# Patient Record
Sex: MALE | Race: WHITE | NOT HISPANIC OR LATINO | Employment: OTHER | URBAN - METROPOLITAN AREA
[De-identification: names, ages, dates, MRNs, and addresses within clinical notes are randomized per-mention and may not be internally consistent; named-entity substitution may affect disease eponyms.]

---

## 2017-01-19 DIAGNOSIS — I25.10 CORONARY ARTERY DISEASE, ANGINA PRESENCE UNSPECIFIED, UNSPECIFIED VESSEL OR LESION TYPE, UNSPECIFIED WHETHER NATIVE OR TRANSPLANTED HEART: Primary | ICD-10-CM

## 2017-01-22 ENCOUNTER — HOSPITAL ENCOUNTER (OUTPATIENT)
Dept: SLEEP MEDICINE | Facility: HOSPITAL | Age: 57
Discharge: HOME | End: 2017-01-22
Attending: NURSE PRACTITIONER
Payer: MEDICAID

## 2017-01-22 PROCEDURE — 95810 POLYSOM 6/> YRS 4/> PARAM: CPT

## 2017-01-24 ENCOUNTER — INITIAL CONSULT (OUTPATIENT)
Dept: CARDIOLOGY | Facility: CLINIC | Age: 57
End: 2017-01-24
Payer: MEDICAID

## 2017-01-24 ENCOUNTER — INITIAL CONSULT (OUTPATIENT)
Dept: RADIATION ONCOLOGY | Facility: CLINIC | Age: 57
End: 2017-01-24
Payer: MEDICAID

## 2017-01-24 VITALS
SYSTOLIC BLOOD PRESSURE: 157 MMHG | WEIGHT: 205 LBS | HEART RATE: 68 BPM | DIASTOLIC BLOOD PRESSURE: 68 MMHG | BODY MASS INDEX: 29.35 KG/M2 | HEIGHT: 70 IN | OXYGEN SATURATION: 98 %

## 2017-01-24 VITALS
SYSTOLIC BLOOD PRESSURE: 157 MMHG | RESPIRATION RATE: 20 BRPM | HEIGHT: 70 IN | DIASTOLIC BLOOD PRESSURE: 68 MMHG | TEMPERATURE: 98 F | BODY MASS INDEX: 29.35 KG/M2 | WEIGHT: 205 LBS | HEART RATE: 68 BPM

## 2017-01-24 DIAGNOSIS — I20.89 ANGINAL EQUIVALENT: ICD-10-CM

## 2017-01-24 DIAGNOSIS — T82.855A CORONARY STENT RESTENOSIS, INITIAL ENCOUNTER: ICD-10-CM

## 2017-01-24 DIAGNOSIS — I25.10 CORONARY ARTERY DISEASE, ANGINA PRESENCE UNSPECIFIED, UNSPECIFIED VESSEL OR LESION TYPE, UNSPECIFIED WHETHER NATIVE OR TRANSPLANTED HEART: Primary | ICD-10-CM

## 2017-01-24 PROCEDURE — 99213 OFFICE O/P EST LOW 20 MIN: CPT | Mod: PBBFAC | Performed by: RADIOLOGY

## 2017-01-24 PROCEDURE — 99999 PR PBB SHADOW E&M-EST. PATIENT-LVL III: CPT | Mod: PBBFAC,,, | Performed by: RADIOLOGY

## 2017-01-24 PROCEDURE — 99202 OFFICE O/P NEW SF 15 MIN: CPT | Mod: S$PBB,,, | Performed by: RADIOLOGY

## 2017-01-24 PROCEDURE — 99205 OFFICE O/P NEW HI 60 MIN: CPT | Mod: S$PBB,,, | Performed by: INTERNAL MEDICINE

## 2017-01-24 PROCEDURE — 99999 PR PBB SHADOW E&M-EST. PATIENT-LVL IV: CPT | Mod: PBBFAC,,, | Performed by: INTERNAL MEDICINE

## 2017-01-24 RX ORDER — PREDNISONE 10 MG/1
50 TABLET ORAL SEE ADMIN INSTRUCTIONS
Status: CANCELLED | OUTPATIENT
Start: 2017-01-24

## 2017-01-24 RX ORDER — DIPHENHYDRAMINE HCL 25 MG
50 CAPSULE ORAL EVERY 6 HOURS
Status: CANCELLED | OUTPATIENT
Start: 2017-01-24 | End: 2017-01-25

## 2017-01-24 RX ORDER — FAMOTIDINE 10 MG/1
20 TABLET ORAL 2 TIMES DAILY
Status: CANCELLED | OUTPATIENT
Start: 2017-01-24 | End: 2017-01-26

## 2017-01-24 RX ORDER — SODIUM CHLORIDE 9 MG/ML
3 INJECTION, SOLUTION INTRAVENOUS CONTINUOUS
Status: CANCELLED | OUTPATIENT
Start: 2017-01-24 | End: 2017-01-24

## 2017-01-24 NOTE — LETTER
January 24, 2017      Melchor Busby MD  [address]  [place] [address]           [facility]  [address]  [facility] [address]  Phone: [phone]          Patient: Adama Alvarez   MR Number: [id number]   YOB: 1960   Date of Visit: 1/24/2017       Dear Dr. Melchor Busby:    Thank you for referring Adama Alvarez to me for evaluation. Attached you will find relevant portions of my assessment and plan of care.    If you have questions, please do not hesitate to call me. I look forward to following Adama Alvarez along with you.    Sincerely,    Yusef Arizmendi MD    Enclosure  CC:  No Recipients    If you would like to receive this communication electronically, please contact [email or web address] or [phone] to request more information on [employer] Link access.    For providers and/or their staff who would like to refer a patient to [facility], please contact us through our one-stop-shop provider referral line, [facility], at [phone].    If you feel you have received this communication in error or would no longer like to receive these types of communications, please e-mail [email or web address]

## 2017-01-24 NOTE — LETTER
January 24, 2017      Yusef Arizmendi MD  [address]  [place] [address]           [facility] Radiation On[facility]  [address]  [place] [address]  Phone: [phone]          Patient: Adama Alvarez   MR Number: [id number]   YOB: 1960   Date of Visit: 1/24/2017       Dear Dr. Yusef Arizmendi:    Thank you for referring Adama Alvarez to me for evaluation. Attached you will find relevant portions of my assessment and plan of care.    If you have questions, please do not hesitate to call me. I look forward to following Adama Alvarez along with you.    Sincerely,    Nader Holloway MD    Enclosure  CC:  No Recipients    If you would like to receive this communication electronically, please contact [email or web address] or [phone] to request more information on [employer] Link access.    For providers and/or their staff who would like to refer a patient to [facility], please contact us through our one-stop-shop provider referral line, [facility], at [phone].    If you feel you have received this communication in error or would no longer like to receive these types of communications, please e-mail [email or web address]

## 2017-01-24 NOTE — PROGRESS NOTES
Subjective:       Patient ID: Adama Alvarez is a 56 y.o. male.    Chief Complaint: cardiac stenosis (radiation consent)    HPI:  This patient is referred for consultation regarding cardiac brachytherapy for prevention of instent restenosis.      Adama Alvarez is a 56 y.o. male with history of ischemic CMP (EF 40-45%), previous LAD and LCx stents (CIS -  in 2006 followed by PTA only in June 2014 by ), PAD, HTN and dyslipidemia who was referred by Dr.Lee Busby following his last coronary angiogram on Nov 2016.  He had an abnormal stress test (moderate reversible defect in apical wall) and received coronary angiogram at [facility] showing severe ISR of previously placed LAD stent and moderate disease of prox LCx.  He has been referred for consideration of PCI with cardiac brachytherapy to reduce the risk of instent restenosis.  He does not yet have his PCI scheduled.  Today he feels relatively well while at rest.  However he becomes SOB with minimal activity such as washing dishes.  He still smokes a few cigarettes per day.        Objective:      Physical Exam:  Patient is alert and is oriented to person, place, and time. He appears well-developed and well-nourished.  HEENT: PERRL EOMI sclerae anicteric.  CV: RRR no m/r/g  Neuro: nonfocal.      Assessment/Plan:       History of CAD with instent restenosis.  Discussed the rationale for cardiac brachytherapy in this setting.  Discussed the procedures, risks and benefits of therapy.  His questions were answered and consent form was signed.  He will schedule is PCI with Dr. Arizmendi's office and we will proceed with treatment if appropriate. Twenty minutes were spent in consultation with the patient, of which more than 50% was spent in face to face counseling.

## 2017-01-24 NOTE — PROGRESS NOTES
Subjective:    Patient ID:  Adama Alvarez is a 56 y.o. male who presents for evaluation of Angina Equivalent and abnormal findings on his Coronary Angiogram.    HPI  55 y/o M with history of ischemic CMP (EF 40-45%), previous LAD and LCx stents (CIS -  in 2006 followed by PTA only in June 2014 by ), PAD, HTN and dyslipidemia who was referred by Dr.Lee Busby following his last coronary angiogram on Nov 2016.    The patient was evaluated by  for worsening DE LEON, had an abnormal stress testing (moderate reversibel defect in apical wall) and received coronary angiogram at [facility] showing severe ISR of previously placed LAD stent and moderate disease of prox LCx.     Patient also has a long standing history of claudication. He experience lower ext (calf mm and feet, L>R) pain when walking 100-150 ft. Denied any orthopnea, PND, LE swelling, palpitations, headaches or nausea. No changes in bilateral sciatica pain. Patient was taking methocarbamol for neurogenic claudication in past but stopped it because it was causing him severe itching. Patient compliant with all other medications. Former smoker 1.5 PPD x 40 yrs (Quit - 4/2016) but retstarted again about 1-2 cigarette.     Review of Systems   Constitution: Positive for weight gain (12 lb for the last 3 mo).   HENT: Negative.    Eyes: Negative.    Cardiovascular: Positive for chest pain, claudication, dyspnea on exertion and orthopnea. Negative for leg swelling, near-syncope, palpitations, paroxysmal nocturnal dyspnea and syncope.   Respiratory: Positive for cough and shortness of breath.    Hematologic/Lymphatic: Bruises/bleeds easily.   Skin: Negative.    Musculoskeletal: Positive for joint pain (bilateral hips) and muscle cramps (calf mm (R>L)).   Gastrointestinal: Negative.    Genitourinary: Negative.    Neurological: Positive for loss of balance (not using any cane ).   Psychiatric/Behavioral: Negative.         Objective:     Physical Exam   Constitutional: He is oriented to person, place, and time. He appears well-developed and well-nourished. No distress.   HENT:   Head: Normocephalic and atraumatic.   Neck: Neck supple. JVD present.   Cardiovascular: Regular rhythm.    Murmur heard.   Harsh midsystolic murmur is present with a grade of 2/6  at the upper right sternal border radiating to the neck  Pulses:       Carotid pulses are 2+ on the right side, and 2+ on the left side.       Radial pulses are 2+ on the right side, and 2+ on the left side.        Femoral pulses are 2+ on the right side, and 1+ on the left side.       Dorsalis pedis pulses are 1+ on the right side        Posterior tibial pulses are 2+ on the right side, and 1+ on the left side.   Normal Chapin's test - R-hand   Pulmonary/Chest: Effort normal and breath sounds normal. No respiratory distress. He has no wheezes.   Abdominal: Soft. He exhibits no distension. There is no tenderness.   Musculoskeletal: He exhibits no edema.   Neurological: He is alert and oriented to person, place, and time.   Skin: Skin is warm and dry.   Psychiatric: He has a normal mood and affect. His behavior is normal.         Assessment:       1. CAD   Cor Angio (Nov 2016) ISR of LAD stent and Prox LCx dis   Abnormal Stress test in apical wall   LVEF (45%) per last echo - Oct 2015    2. Claudication (sunny IIa) - L>R   Abnormal OLIVIA (left 0.65) - July 2016   CTA aort0-iliac w RO (Sept 2015): Bilateral lower extremity outflow stenosis, with multiple tandem 50% stenotic lesions in distal right SFA, long segment of complete occlusion of mid left SFA; both popliteal arteries normal in caliber with 3-vessel runoff to ankle.    3. HTN   Not controlled.   Quit Imdur because of concerns about sleep disturbances.  4. HLP   On statin    5. CKD- stage III   Cr baseline - 1.4-1.5    6.Current smoking   Counseled about smoking cessation.  7. PETRONA       Plan:           Consented for [facility] w possible  PCI  Radiation oncology for Brachytherapy treatment.    The risks (including death, heart attack, limb loss, paralysis, emergency surgery, bleeding, renal failure, and stroke), the potential benefits of the procedure, and the alternatives to the procedure, were discussed in detail and accepted by the patient. All questions were answered. Patient agrees to proceed.    Maryanne Zapien MD  Interventional Cardiology Fellow, PGY-9  1/24/2017 12:11 PM         I have personally taken the history and examined this patient. I have discussed and agree with the resident's findings and plan as documented in the resident's note.  Yusef Arizmendi

## 2017-01-26 ENCOUNTER — DOCUMENTATION ONLY (OUTPATIENT)
Dept: CARDIOLOGY | Facility: CLINIC | Age: 57
End: 2017-01-26

## 2017-01-26 NOTE — PROGRESS NOTES
OUTPATIENT CATHETERIZATION INSTRUCTIONS    You have been scheduled for a procedure in the catheterization lab on Friday, February 3,  2017.     Please report to the Cardiology Waiting Area on the Third floor of the hospital and check in at 8 AM.   You will then be taken to the SSCU (Short Stay Cardiac Unit) and prepared for your procedure. Please be aware that this is not the time of your procedure but the time you are to arrive. The procedures are scheduled on an hourly basis; however, emergency cases take precedence over all other cases.       You may not have anything to eat or drink after midnight the night before your test. You may take your regular morning medications with water. If there are any medications that you should not take you will be instructed to hold them that morning. If you are diabetic and on Metformin (Glucophage) do not take it the day before, the day of, and for 2 days after your procedure.      The procedure will take 1-2 hours to perform. After the procedure, you will return to SSCU on the third floor of the hospital. You will need to lie still (or keep your arm still) for the next 4 to 6 hours to minimize bleeding from the puncture site. Your family may remain in the room with you during this time.       You may be able to be discharged home that same afternoon if there is someone to drive you home and there were no complications. If you have one of the balloon, stent, or device procedures you may spend the night in the hospital. Your doctor will determine, based on your progress, the date and time of your discharge. The results of your procedure will be discussed with you before you are discharged. Any further testing or procedures will be scheduled for you either before you leave or you will be called with these appointments.       If you should have any questions, concerns, or need to change the date of your procedure, please call  SUSANNE Hoover @ [phone]    Special  Instructions:Because you are sensitive to Iodine Dye and dye is used to perform the test, your doctor would like you to take several medications before the procedure to help prevent a severe reaction. You will be given prescriptions for Benadryl 50mg, Prednisone 50mg, and Tagamet 300mg. You will be given 3 of each pill. Take 1 of each pill (Benadryl, Prednisone, and Tagamet) at 6pm the night before your procedure, 1 of each pill (Benadryl, Prednisone, and Tagamet) at 11pm the night before your procedure, and 1 of each pill (Benadryl, Prednisone, and Tagamet) at 6am the morning of your procedure.          THE ABOVE INSTRUCTIONS WERE GIVEN TO THE PATIENT VERBALLY AND THEY VERBALIZED UNDERSTANDING.  THEY DO NOT REQUIRE ANY SPECIAL NEEDS AND DO NOT HAVE ANY LEARNING BARRIERS.          Directions for Reporting to Cardiology Waiting Area in the Hospital  If you park in the Parking Garage:  Take elevators to the1st floor of the parking garage.  Continue past the gift shop, coffee shop, and piano.  Take a right and go to the gold elevators. (Elevator B)  Take the elevator to the 3rd floor.  Follow the arrow on the sign on the wall that says Cath Lab Registration/EP Lab Registration.  Follow the long hallway all the way around until you come to a big open area.  This is the registration area.  Check in at Reception Desk.    OR    If family is dropping you off:  Have them drop you off at the front of the Hospital under the green overhang.  Enter through the doors and take a right.  Take the E elevators to the 3rd floor Cardiology Waiting Area.  Check in at the Reception Desk in the waiting room.

## 2017-02-03 ENCOUNTER — HOSPITAL ENCOUNTER (OUTPATIENT)
Facility: HOSPITAL | Age: 57
Discharge: HOME | End: 2017-02-04
Attending: INTERNAL MEDICINE | Admitting: INTERNAL MEDICINE
Payer: MEDICAID

## 2017-02-03 DIAGNOSIS — I25.10 CORONARY ARTERY DISEASE, ANGINA PRESENCE UNSPECIFIED, UNSPECIFIED VESSEL OR LESION TYPE, UNSPECIFIED WHETHER NATIVE OR TRANSPLANTED HEART: ICD-10-CM

## 2017-02-03 DIAGNOSIS — I25.118 CORONARY ARTERY DISEASE WITH STABLE ANGINA PECTORIS, UNSPECIFIED VESSEL OR LESION TYPE, UNSPECIFIED WHETHER NATIVE OR TRANSPLANTED HEART: Primary | ICD-10-CM

## 2017-02-03 DIAGNOSIS — N39.0 URINARY TRACT INFECTION: ICD-10-CM

## 2017-02-03 LAB
ABO + RH BLD: NORMAL
ANION GAP SERPL CALC-SCNC: 8 MMOL/L
APTT BLDCRRT: 27.2 SEC
BASOPHILS # BLD AUTO: 0.02 K/UL
BASOPHILS NFR BLD: 0.1 %
BLD GP AB SCN CELLS X3 SERPL QL: NORMAL
BUN SERPL-MCNC: 30 MG/DL
CALCIUM SERPL-MCNC: 9 MG/DL
CHLORIDE SERPL-SCNC: 105 MMOL/L
CO2 SERPL-SCNC: 23 MMOL/L
CORONARY STENOSIS: ABNORMAL
CREAT SERPL-MCNC: 1.8 MG/DL
DIFFERENTIAL METHOD: ABNORMAL
EOSINOPHIL # BLD AUTO: 0 K/UL
EOSINOPHIL NFR BLD: 0 %
ERYTHROCYTE [DISTWIDTH] IN BLOOD BY AUTOMATED COUNT: 14.6 %
ERYTHROCYTE [DISTWIDTH] IN BLOOD BY AUTOMATED COUNT: 14.6 %
EST. GFR  (AFRICAN AMERICAN): 47.9 ML/MIN/1.73 M^2
EST. GFR  (NON AFRICAN AMERICAN): 41.4 ML/MIN/1.73 M^2
GLUCOSE SERPL-MCNC: 149 MG/DL
HCT VFR BLD AUTO: 42.7 %
HCT VFR BLD AUTO: 42.7 %
HGB BLD-MCNC: 14.5 G/DL
HGB BLD-MCNC: 14.5 G/DL
INR PPP: 1
LYMPHOCYTES # BLD AUTO: 2.4 K/UL
LYMPHOCYTES NFR BLD: 12.5 %
MCH RBC QN AUTO: 32 PG
MCH RBC QN AUTO: 32 PG
MCHC RBC AUTO-ENTMCNC: 34 %
MCHC RBC AUTO-ENTMCNC: 34 %
MCV RBC AUTO: 94 FL
MCV RBC AUTO: 94 FL
MONOCYTES # BLD AUTO: 0.6 K/UL
MONOCYTES NFR BLD: 3.3 %
NEUTROPHILS # BLD AUTO: 15.7 K/UL
NEUTROPHILS NFR BLD: 83.7 %
PLATELET # BLD AUTO: 232 K/UL
PLATELET # BLD AUTO: 232 K/UL
PMV BLD AUTO: 11.1 FL
PMV BLD AUTO: 11.1 FL
POTASSIUM SERPL-SCNC: 4.8 MMOL/L
PROTHROMBIN TIME: 10.4 SEC
RBC # BLD AUTO: 4.53 M/UL
RBC # BLD AUTO: 4.53 M/UL
SODIUM SERPL-SCNC: 136 MMOL/L
WBC # BLD AUTO: 18.83 K/UL
WBC # BLD AUTO: 18.83 K/UL

## 2017-02-03 PROCEDURE — 92974 CATH PLACE CARDIO BRACHYTX: CPT | Mod: LD,,, | Performed by: INTERNAL MEDICINE

## 2017-02-03 PROCEDURE — 25000003 PHARM REV CODE 250: Performed by: INTERNAL MEDICINE

## 2017-02-03 PROCEDURE — 25000003 PHARM REV CODE 250: Performed by: HOSPITALIST

## 2017-02-03 PROCEDURE — 86900 BLOOD TYPING SEROLOGIC ABO: CPT

## 2017-02-03 PROCEDURE — C1769 GUIDE WIRE: HCPCS

## 2017-02-03 PROCEDURE — 93010 ELECTROCARDIOGRAM REPORT: CPT | Mod: 76,,, | Performed by: INTERNAL MEDICINE

## 2017-02-03 PROCEDURE — 63600175 PHARM REV CODE 636 W HCPCS

## 2017-02-03 PROCEDURE — 85025 COMPLETE CBC W/AUTO DIFF WBC: CPT

## 2017-02-03 PROCEDURE — 93572 IV DOP VEL&/PRESS C FLO EA: CPT | Mod: 26,,, | Performed by: INTERNAL MEDICINE

## 2017-02-03 PROCEDURE — C1760 CLOSURE DEV, VASC: HCPCS

## 2017-02-03 PROCEDURE — 25000003 PHARM REV CODE 250

## 2017-02-03 PROCEDURE — 85610 PROTHROMBIN TIME: CPT

## 2017-02-03 PROCEDURE — 93005 ELECTROCARDIOGRAM TRACING: CPT | Mod: 59

## 2017-02-03 PROCEDURE — 80048 BASIC METABOLIC PNL TOTAL CA: CPT

## 2017-02-03 PROCEDURE — 86850 RBC ANTIBODY SCREEN: CPT

## 2017-02-03 PROCEDURE — 93571 IV DOP VEL&/PRESS C FLO 1ST: CPT | Mod: 26,,, | Performed by: INTERNAL MEDICINE

## 2017-02-03 PROCEDURE — 93010 ELECTROCARDIOGRAM REPORT: CPT | Mod: ,,, | Performed by: INTERNAL MEDICINE

## 2017-02-03 PROCEDURE — 92924 PRQ TRLUML C ATHRC 1 ART&/BR: CPT | Mod: LD,,, | Performed by: INTERNAL MEDICINE

## 2017-02-03 PROCEDURE — 85730 THROMBOPLASTIN TIME PARTIAL: CPT

## 2017-02-03 RX ORDER — ATORVASTATIN CALCIUM 20 MG/1
80 TABLET, FILM COATED ORAL NIGHTLY
Status: DISCONTINUED | OUTPATIENT
Start: 2017-02-03 | End: 2017-02-04 | Stop reason: HOSPADM

## 2017-02-03 RX ORDER — METOPROLOL TARTRATE 25 MG/1
12.5 TABLET ORAL 2 TIMES DAILY
Status: DISCONTINUED | OUTPATIENT
Start: 2017-02-03 | End: 2017-02-04 | Stop reason: HOSPADM

## 2017-02-03 RX ORDER — LISINOPRIL 20 MG/1
20 TABLET ORAL NIGHTLY
Status: DISCONTINUED | OUTPATIENT
Start: 2017-02-03 | End: 2017-02-04 | Stop reason: HOSPADM

## 2017-02-03 RX ORDER — GABAPENTIN 300 MG/1
600 CAPSULE ORAL 2 TIMES DAILY
Status: DISCONTINUED | OUTPATIENT
Start: 2017-02-03 | End: 2017-02-04 | Stop reason: HOSPADM

## 2017-02-03 RX ORDER — RANOLAZINE 500 MG/1
1000 TABLET, EXTENDED RELEASE ORAL 2 TIMES DAILY
Status: DISCONTINUED | OUTPATIENT
Start: 2017-02-03 | End: 2017-02-04 | Stop reason: HOSPADM

## 2017-02-03 RX ORDER — SODIUM CHLORIDE 9 MG/ML
3 INJECTION, SOLUTION INTRAVENOUS CONTINUOUS
Status: ACTIVE | OUTPATIENT
Start: 2017-02-03 | End: 2017-02-03

## 2017-02-03 RX ORDER — FAMOTIDINE 20 MG/1
20 TABLET, FILM COATED ORAL 2 TIMES DAILY
Status: DISCONTINUED | OUTPATIENT
Start: 2017-02-03 | End: 2017-02-03

## 2017-02-03 RX ORDER — CLOPIDOGREL BISULFATE 75 MG/1
75 TABLET ORAL NIGHTLY
Status: DISCONTINUED | OUTPATIENT
Start: 2017-02-03 | End: 2017-02-04 | Stop reason: HOSPADM

## 2017-02-03 RX ORDER — ASPIRIN 81 MG/1
81 TABLET ORAL NIGHTLY
Status: DISCONTINUED | OUTPATIENT
Start: 2017-02-03 | End: 2017-02-04 | Stop reason: HOSPADM

## 2017-02-03 RX ORDER — ISOSORBIDE MONONITRATE 30 MG/1
30 TABLET, EXTENDED RELEASE ORAL DAILY
Status: DISCONTINUED | OUTPATIENT
Start: 2017-02-04 | End: 2017-02-04 | Stop reason: HOSPADM

## 2017-02-03 RX ORDER — EZETIMIBE 10 MG/1
10 TABLET ORAL DAILY
Status: DISCONTINUED | OUTPATIENT
Start: 2017-02-04 | End: 2017-02-04 | Stop reason: HOSPADM

## 2017-02-03 RX ORDER — HYDROCHLOROTHIAZIDE 25 MG/1
25 TABLET ORAL DAILY
Status: DISCONTINUED | OUTPATIENT
Start: 2017-02-04 | End: 2017-02-04 | Stop reason: HOSPADM

## 2017-02-03 RX ORDER — DIPHENHYDRAMINE HCL 50 MG
50 CAPSULE ORAL EVERY 6 HOURS
Status: DISCONTINUED | OUTPATIENT
Start: 2017-02-03 | End: 2017-02-03

## 2017-02-03 RX ADMIN — ASPIRIN 81 MG: 81 TABLET, COATED ORAL at 09:02

## 2017-02-03 RX ADMIN — ATORVASTATIN CALCIUM 80 MG: 20 TABLET, FILM COATED ORAL at 09:02

## 2017-02-03 RX ADMIN — LISINOPRIL 20 MG: 20 TABLET ORAL at 09:02

## 2017-02-03 RX ADMIN — RANOLAZINE 1000 MG: 500 TABLET, FILM COATED, EXTENDED RELEASE ORAL at 09:02

## 2017-02-03 RX ADMIN — CLOPIDOGREL 75 MG: 75 TABLET, FILM COATED ORAL at 09:02

## 2017-02-03 RX ADMIN — GABAPENTIN 600 MG: 300 CAPSULE ORAL at 09:02

## 2017-02-03 RX ADMIN — SODIUM CHLORIDE 3 ML/KG/HR: 0.9 INJECTION, SOLUTION INTRAVENOUS at 08:02

## 2017-02-03 RX ADMIN — Medication 12.5 MG: at 09:02

## 2017-02-03 NOTE — DISCHARGE SUMMARY
[facility]  Cardiology  Discharge Summary      Patient Name: Adama Alvarez  MRN: [id number]  Admission Date: 2/3/2017  Hospital Length of Stay: 0 days  Discharge Date and Time: 2/3/2017 5:24 PM  Attending Physician: Yusef Arizmendi MD  Discharging Provider: Gennaro Draper MD  Primary Care Physician: YESI Schrader    HPI: 57 yo male with CAD with LAD ISR with CCS III angina      Procedure(s) (LRB):  [id number]-[id number]-[id number]-CORONARY (PTCA) (Left)  BRACHYTHERAPY (Left)  HEART CATH-LEFT (Left)     Indwelling Lines/Drains at time of discharge:  Lines/Drains/Airways          No matching active lines, drains, or airways          Hospital Course (synopsis of major diagnoses, care, treatment, and services provided during the course of the hospital stay): Orbital atherectomy of LAD ISR followed by POBA with a 3.0 x 20 mm balloon  Brachytherapy by Rad-onc        Final Active Diagnoses:    Diagnosis Date Noted POA    Coronary artery disease [I25.10] 02/03/2017 Yes      Problems Resolved During this Admission:    Diagnosis Date Noted Date Resolved POA       Discharged Condition: good    Disposition: Home or Self Care    Follow Up:  Follow-up Information     Follow up with Melchor Busby MD In 1 month.    Specialty:  Cardiology    Contact information:    [address]  [place] [id number] [phone]          Patient Instructions:     Diet Cardiac     Type & Screen   Standing Status: Future  Standing Exp. Date: 03/25/18       Medications:  Reconciled Home Medications:   Current Discharge Medication List      CONTINUE these medications which have NOT CHANGED    Details   ASPIR-LOW 81 mg EC tablet Take 81 mg by mouth every evening.  Refills: 11      atorvastatin (LIPITOR) 80 MG tablet Take 1 tablet (80 mg total) by mouth every evening.  Qty: 30 tablet, Refills: 11    Associated Diagnoses: Mixed hyperlipidemia      clopidogrel (PLAVIX) 75 mg tablet Take 1 tablet (75 mg total) by mouth  once daily.  Qty: 90 tablet, Refills: 11      docusate sodium (COLACE) 50 MG capsule Take 1 capsule (50 mg total) by mouth 2 (two) times daily as needed for Constipation.  Qty: 20 capsule, Refills: 0      ezetimibe (ZETIA) 10 mg tablet Take 1 tablet (10 mg total) by mouth once daily.  Qty: 30 tablet, Refills: 11    Associated Diagnoses: Dyslipidemia      gabapentin (NEURONTIN) 600 MG tablet Take 1 tablet (600 mg total) by mouth 2 (two) times daily.  Qty: 60 tablet, Refills: 11    Associated Diagnoses: Lumbar radiculopathy      hydrochlorothiazide (HYDRODIURIL) 25 MG tablet Take 1 tablet (25 mg total) by mouth once daily.  Qty: 30 tablet, Refills: 11    Associated Diagnoses: Essential hypertension      isosorbide mononitrate (IMDUR) 30 MG 24 hr tablet Take 1 tablet (30 mg total) by mouth once daily.  Qty: 30 tablet, Refills: 11      lisinopril (PRINIVIL,ZESTRIL) 20 MG tablet Take 1 tablet (20 mg total) by mouth every evening.  Qty: 30 tablet, Refills: 11    Associated Diagnoses: Coronary artery disease of native heart with stable angina pectoris, unspecified vessel or lesion type      metoprolol tartrate (LOPRESSOR) 25 MG tablet Take 0.5 tablets (12.5 mg total) by mouth 2 (two) times daily.  Qty: 30 tablet, Refills: 11    Associated Diagnoses: Coronary artery disease of native heart with stable angina pectoris, unspecified vessel or lesion type      ranolazine (RANEXA) 1,000 mg Tb12 Take 1 tablet (1,000 mg total) by mouth 2 (two) times daily. 500 mg, twice a day for two weeks, then increase to 1000mg twice a day  Qty: 180 tablet, Refills: 11      albuterol 90 mcg/actuation inhaler Inhale 2 puffs into the lungs every 6 (six) hours as needed for Wheezing.  Qty: 18 g, Refills: 0         STOP taking these medications       cimetidine (TAGAMET) 300 MG tablet Comments:   Reason for Stopping:         diphenhydrAMINE (BANOPHEN) 50 MG capsule Comments:   Reason for Stopping:         predniSONE (DELTASONE) 50 MG Tab Comments:    Reason for Stopping:               Time spent on the discharge of patient: 30 minutes    Gennaro Draper MD  Cardiology  [facility]

## 2017-02-03 NOTE — PLAN OF CARE
Problem: Patient Care Overview  Goal: Plan of Care Review  Outcome: Ongoing (interventions implemented as appropriate)  Overnight care and needs reviewed

## 2017-02-03 NOTE — IP AVS SNAPSHOT
[facility]  [address]  [facility] [address]  Phone: [phone]           Patient Discharge Instructions     Our goal is to set you up for success. This packet includes information on your condition, medications, and your home care. It will help you to care for yourself so you don't get sicker and need to go back to the hospital.     Please ask your nurse if you have any questions.        There are many details to remember when preparing to leave the hospital. Here is what you will need to do:    1. Take your medicine. If you are prescribed medications, review your Medication List in the following pages. You may have new medications to  at the pharmacy and others that you'll need to stop taking. Review the instructions for how and when to take your medications. Talk with your doctor or nurses if you are unsure of what to do.     2. Go to your follow-up appointments. Specific follow-up information is listed in the following pages. Your may be contacted by a transition nurse or clinical provider about future appointments. Be sure we have all of the phone numbers to reach you, if needed. Please contact your provider's office if you are unable to make an appointment.     3. Watch for warning signs. Your doctor or nurse will give you detailed warning signs to watch for and when to call for assistance. These instructions may also include educational information about your condition. If you experience any of warning signs to your health, call your doctor.               [facility]  Unless otherwise directed by your provider, please contact [facility], our nurse care line that is available for 24/7 assistance.     [phone] (toll-free)    Registered nurses in the [facility] provide clinical advisement, health education, appointment booking, and other advisory services.                    ** Verify the list of medication(s) below is accurate and up  to date. Carry this with you in case of emergency. If your medications have changed, please notify your healthcare provider.             Medication List      CHANGE how you take these medications        Additional Info                      clopidogrel 75 mg tablet   Commonly known as:  PLAVIX   Quantity:  90 tablet   Refills:  11   Dose:  75 mg   What changed:  when to take this    Last time this was given:  75 mg on 2/3/2017  9:02 PM   Instructions:  Take 1 tablet (75 mg total) by mouth once daily.     Begin Date    AM    Noon    PM    Bedtime         CONTINUE taking these medications        Additional Info                      albuterol 90 mcg/actuation inhaler   Quantity:  18 g   Refills:  0   Dose:  2 puff    Instructions:  Inhale 2 puffs into the lungs every 6 (six) hours as needed for Wheezing.     Begin Date    AM    Noon    PM    Bedtime       ASPIR-LOW 81 MG EC tablet   Refills:  11   Dose:  81 mg   Generic drug:  aspirin    Last time this was given:  81 mg on 2/3/2017  9:03 PM   Instructions:  Take 81 mg by mouth every evening.     Begin Date    AM    Noon    PM    Bedtime       atorvastatin 80 MG tablet   Commonly known as:  LIPITOR   Quantity:  30 tablet   Refills:  11   Dose:  80 mg    Last time this was given:  80 mg on 2/3/2017  9:03 PM   Instructions:  Take 1 tablet (80 mg total) by mouth every evening.     Begin Date    AM    Noon    PM    Bedtime       docusate sodium 50 MG capsule   Commonly known as:  COLACE   Quantity:  20 capsule   Refills:  0   Dose:  50 mg    Instructions:  Take 1 capsule (50 mg total) by mouth 2 (two) times daily as needed for Constipation.     Begin Date    AM    Noon    PM    Bedtime       ezetimibe 10 mg tablet   Commonly known as:  ZETIA   Quantity:  30 tablet   Refills:  11   Dose:  10 mg    Instructions:  Take 1 tablet (10 mg total) by mouth once daily.     Begin Date    AM    Noon    PM    Bedtime       gabapentin 600 MG tablet   Commonly known as:  NEURONTIN    Quantity:  60 tablet   Refills:  11   Dose:  600 mg    Instructions:  Take 1 tablet (600 mg total) by mouth 2 (two) times daily.     Begin Date    AM    Noon    PM    Bedtime       hydrochlorothiazide 25 MG tablet   Commonly known as:  HYDRODIURIL   Quantity:  30 tablet   Refills:  11   Dose:  25 mg    Instructions:  Take 1 tablet (25 mg total) by mouth once daily.     Begin Date    AM    Noon    PM    Bedtime       isosorbide mononitrate 30 MG 24 hr tablet   Commonly known as:  IMDUR   Quantity:  30 tablet   Refills:  11   Dose:  30 mg    Instructions:  Take 1 tablet (30 mg total) by mouth once daily.     Begin Date    AM    Noon    PM    Bedtime       lisinopril 20 MG tablet   Commonly known as:  PRINIVIL,ZESTRIL   Quantity:  30 tablet   Refills:  11   Dose:  20 mg    Last time this was given:  20 mg on 2/3/2017  9:02 PM   Instructions:  Take 1 tablet (20 mg total) by mouth every evening.     Begin Date    AM    Noon    PM    Bedtime       metoprolol tartrate 25 MG tablet   Commonly known as:  LOPRESSOR   Quantity:  30 tablet   Refills:  11   Dose:  12.5 mg    Last time this was given:  12.5 mg on 2/3/2017  9:02 PM   Instructions:  Take 0.5 tablets (12.5 mg total) by mouth 2 (two) times daily.     Begin Date    AM    Noon    PM    Bedtime       ranolazine 1,000 mg Tb12   Commonly known as:  RANEXA   Quantity:  180 tablet   Refills:  11   Dose:  1000 mg    Last time this was given:  1,000 mg on 2/3/2017  9:02 PM   Instructions:  Take 1 tablet (1,000 mg total) by mouth 2 (two) times daily. 500 mg, twice a day for two weeks, then increase to 1000mg twice a day     Begin Date    AM    Noon    PM    Bedtime         STOP taking these medications     BANOPHEN 50 MG capsule   Generic drug:  diphenhydrAMINE       cimetidine 300 MG tablet   Commonly known as:  TAGAMET       predniSONE 50 MG Tab   Commonly known as:  DELTASONE                  Please bring to all follow up appointments:    1. A copy of your discharge  instructions.  2. All medicines you are currently taking in their original bottles.  3. Identification and insurance card.    Please arrive 15 minutes ahead of scheduled appointment time.    Please call 24 hours in advance if you must reschedule your appointment and/or time.        Your Scheduled Appointments     Feb 21, 2017 12:00 PM CST   [facility] with [facility] US[facility]   [facility] ([facility])    [address] [facility] [address]   [phone]            Feb 21, 2017  1:45 PM CST   Established Patient Visit with RENNY Sanchez - Neurology ([facility])    [address] [facility] [place] [address]   [phone]            Mar 21, 2017 10:30 AM CDT   Established Patient Visit with MD WALTER Dill - Cardiology ([address] [facility])    [address] [facility] [address]   [phone]            May 04, 2017  8:00 AM CDT   Established Patient Visit with YESI Jackson - Family Medicine ([facility])    [address] [place] [address]   [phone]            Jun 05, 2017  2:00 PM CDT   Established Patient Visit with MD WALTER Spencer - Nephrology ([facility])    [address] [place] [address] [place] [address]   [phone]              Follow-up Information     Follow up with Melchor Busby MD In 1 month.    Specialty:  Cardiology    Contact information:    [address] [facility] [address]  [phone]          Discharge Instructions     Future Orders    Type & Screen     Diet Cardiac         Discharge Instructions       1. Do not strain or lift anything greater than 5 lb for 3 days.   2. Do not drive or operate any dangerous machinery for 24 hours.   3. Keep the dressing on, clean, and dry for 24 hours.   4. After 24 hours, the dressing may be removed and a shower is allowed.   5. Clean the area with mild soap and  "water.   6. Once the skin has healed (3 days), bathing in a tub or swimming is allowed.   7. Inspect the groin site daily and report to the physician any signs of infection at the site: redness, pain, fever >100.4, unusual pain at the   access site or affected extremity, unusual swelling at the access site, or any yellow, white or green drainage.  Call 911 if you have:   Bleeding from the puncture site that you cannot stop by doing the following:   Relax and lie down right away. Keep your leg flat and apply firm pressure to the site using your fingers and a gauze pad. Keep the pressure on for 10 minutes. Continue this until the bleeding stops. This may take awhile. When bleeding stops, cover the site with a sterile bandage and keep your leg still as much as possible.    Discharge References/Attachments     CARDIAC REHABILITATION, FOLLOWING AN EXERCISE PROGRAM (ENGLISH)    REHABILITATION, CARDIAC (ENGLISH)    SMOKING, TIPS FOR QUITTING (CARDIOVASCULAR) (ENGLISH)    SMOKING CESSATION (ENGLISH)    SMOKE-FREE, STAYING (ENGLISH)        Admission Information     Date & Time Provider Department CSN    2/3/2017  8:16 AM Yusef Arizmendi MD [facility] [id number]      Care Providers     Provider Role Specialty Primary office phone    Yusef Arizmendi MD Attending Provider Cardiology [phone]      Your Vitals Were     BP Pulse Temp Resp Height Weight    100/55 (BP Location: Left arm, Patient Position: Sitting, BP Method: Automatic) 62 97.7 °F (36.5 °C) (Oral) 18 5' 6" (1.676 m) 92.5 kg (204 lb)    SpO2 BMI             99% 32.93 kg/m2         Recent Lab Values        6/16/2015 6/27/2016                        8:34 AM  8:04 AM          A1C 5.6 5.3                     Allergies as of 2/4/2017        Reactions    Contrast Media Rash    Methocarbamol Itching    Tramadol Blisters      Advance Directives     An advance directive is a document which, in the event you are no longer able to make decisions for " yourself, tells your healthcare team what kind of treatment you do or do not want to receive, or who you would like to make those decisions for you.  If you do not currently have an advance directive, [facility] encourages you to create one.  For more information call:  [phone]), [phone] ([phone]),  or log on to [email or web address].        Smoking Cessation     If you would like to quit smoking:   You may be eligible for free services if you are a [place] resident and started smoking cigarettes before September 1, 1988.  Call the Smoking Cessation Trust ([facility]) toll free at (702) 611-3758 or (400) 557-6356.   Call 7-055-QUIT-NOW if you do not meet the above criteria.            Language Assistance Services     ATTENTION: Language assistance services are available, free of charge. Please call [phone].      ATENCIÓN: Si habla español, tiene a smalls disposición servicios gratuitos de asistencia lingüística. Llame al [phone].     CHÚ Ý: N?u b?n nói Ti?ng Vi?t, có các d?ch v? h? tr? ngôn ng? mi?n phí dành cho b?n. G?i s? [phone].        Heart Failure Education       Heart Failure: Being Active  You have a condition called heart failure. Being active doesnt mean that you have to wear yourself out. Even a little movement each day helps to strengthen your heart. If you cant get out to exercise, you can do simple stretching and strengthening exercises at home. These are good ways to keep you well-conditioned and prevent you and your heart from becoming excessively weak.    Ideas to get you started  · Add a little movement to things you do now. Walk to mail letters. Park your car at the far end of the parking lot and walk to the store. Walk up a flight of stairs instead of taking the elevator.  · Choose activities you enjoy. You might walk, swim, or ride an exercise bike. Things like gardening and washing the car count, too. Other possibilities include: washing dishes,  walking the dog, walking around the mall, and doing aerobic activities with friends.  · Join a group exercise program at a [facility], a senior center, or a community center. Or look into a hospital cardiac rehabilitation program. Ask your doctor if you qualify.  Tips to keep you going  · Get up and get dressed each day. Go to a coffee shop and read a newspaper or go somewhere that you'll be in the presence of other active people. Youll feel more like being active.  · Make a plan. Choose one or more activities that you enjoy and that you can easily do. Then plan to do at least one each day. You might write your plan on a calendar.  · Go with a friend or a group if you like company. This can help you feel supported and stay motivated, too.  · Plan social events that you enjoy. This will keep you mentally engaged as well as physically motivated to do things you find pleasure in.  For your safety  · Talk with your healthcare provider before starting an exercise program.  · Exercise indoors when its too hot or too cold outside, or when the air quality is poor. Try walking at a shopping mall.  · Wear socks and sturdy shoes to maintain your balance and prevent falls.  · Start slowly. Do a few minutes several times a day at first. Increase your time and speed little by little.  · Stop and rest whenever you feel tired or get short of breath.  · Dont push yourself on days when you dont feel well.  Date Last Reviewed: 3/20/2016  © 2057-0686 [employer]. [address] [place] [address], [place] [address]. All rights reserved. This information is not intended as a substitute for professional medical care. Always follow your healthcare professional's instructions.              Heart Failure: Evaluating Your Heart  You have a condition called heart failure. To evaluate your condition, your doctor will examine you, ask questions, and do some tests. Along with looking for signs of heart failure, the doctor looks for any  other health problems that may have led to heart failure. The results of your evaluation will help your doctor form a treatment plan.  Health history and physical exam  Your visit will start with a health history. Tell the doctor about any symptoms youve noticed and about all medicines you take. Then youll have a physical exam. This includes listening to your heartbeat and breathing. Youll also be checked for swelling (edema) in your legs and neck. When you have fluid buildup or fluid in the lungs, it may be called congestive heart failure.  Diagnosing heart failure     During an echocardiogram, sound waves bounce off the heart. These are converted into a picture on the screen.   The following may be done to help your doctor form a diagnosis:  · X-rays show the size and shape of your heart. These pictures can also show fluid in your lungs.  · An electrocardiogram (ECG or EKG) shows the pattern of your heartbeat. Small pads (electrodes) are placed on your chest, arms, and legs. Wires connect the pads to the ECG machine, which records your hearts electrical signals. This can give the doctor information about heart function.  · An echocardiogram uses ultrasound waves to show the structure and movement of your heart muscle. This shows how well the heart pumps. It also shows the thickness of the heart walls, and if the heart is enlarged. It is one of the most useful, non-invasive tests as it provides information about the heart's general function. This helps your doctor make treatment decisions.  · Lab tests evaluate small amounts of blood or urine for signs of problems. A BNP lab test can help diagnose and evaluate heart failure. BNP stands for B-type natriuretic peptide. The ventricles secrete more BNP when heart failure worsens. Lab tests can also provide information about metabolic dysfunction or heart dysfunction.  Your treatment plan  Based on the results of your evaluation and tests, your doctor will develop a  treatment plan. This plan is designed to relieve some of your heart failure symptoms and help make you more comfortable. Your treatment plan may include:  · Medicine to help your heart work better and improve your quality of life  · Changes in what you eat and drink to help prevent fluid from backing up in your body  · Daily monitoring of your weight and heart failure symptoms to see how well your treatment plan is working  · Exercise to help you stay healthy  · Help with quitting smoking  · Emotional and psychological support to help adjust to the changes  · Referrals to other specialists to make sure you are being treated comprehensively  Date Last Reviewed: 3/21/2016  © 7496-8113 [employer]. [address] [place] [address], [place] [address]. All rights reserved. This information is not intended as a substitute for professional medical care. Always follow your healthcare professional's instructions.              Heart Failure: Making Changes to Your Diet  You have a condition called heart failure. When you have heart failure, excess fluid is more likely to build up in your body because your heart isn't working well. This makes the heart work harder to pump blood. Fluid buildup causes symptoms such as shortness of breath and swelling (edema). This is often referred to as congestive heart failure or CHF. Controlling the amount of salt (sodium) you eat may help stop fluid from building up. Your doctor may also tell you to reduce the amount of fluid you drink.  Reading food labels    Your healthcare provider will tell you how much sodium you can eat each day. Read food labels to keep track. Keep in mind that certain foods are high in salt. These include canned, frozen, and processed foods. Check the amount of sodium in each serving. Watch out for high-sodium ingredients. These include MSG (monosodium glutamate), baking soda, and sodium phosphate.   Eating less salt  Give yourself time to get used to eating less  salt. It may take a little while. Here are some tips to help:  · Take the saltshaker off the table. Replace it with salt-free herb mixes and spices.  · Eat fresh or plain frozen vegetables. These have much less salt than canned vegetables.  · Choose low-sodium snacks like sodium-free pretzels, crackers, or air-popped popcorn.  · Dont add salt to your food when youre cooking. Instead, season your foods with pepper, lemon, garlic, or onion.  · When you eat out, ask that your food be cooked without added salt.  · Avoid eating fried foods as these often have a great deal of salt.  If youre told to limit fluids  You may need to limit how much fluid you have to help prevent swelling. This includes anything that is liquid at room temperature, such as ice cream and soup. If your doctor tells you to limit fluid, try these tips:  · Measure drinks in a measuring cup before you drink them. This will help you meet daily goals.  · Chill drinks to make them more refreshing.  · Suck on frozen lemon wedges to quench thirst.  · Only drink when youre thirsty.  · Chew sugarless gum or suck on hard candy to keep your mouth moist.  · Weigh yourself daily to know if your body's fluid content is rising.  My sodium goal  Your healthcare provider may give you a sodium goal to meet each day. This includes sodium found in food as well as salt that you add. My goal is to eat no more than ___________ mg of sodium per day.     When to call your doctor  Call your doctor right away if you have any symptoms of worsening heart failure. These can include:  · Sudden weight gain  · Increased swelling of your legs or ankles  · Trouble breathing when youre resting or at night  · Increase in the number of pillows you have to sleep on  · Chest pain, pressure, discomfort, or pain in the jaw, neck, or back   Date Last Reviewed: 3/21/2016  © 5776-8524 [employer]. [address] [place] [address], [place] [address]. All rights reserved. This  information is not intended as a substitute for professional medical care. Always follow your healthcare professional's instructions.              Heart Failure: Medicines to Help Your Heart    You have a condition called heart failure (also known as congestive heart failure, or CHF). Your doctor will likely prescribe medicines for heart failure and any underlying health problems you have. Most heart failure patients take one or more types of medicinen. Your healthcare provider will work to find the combination of medicines that works best for you.  Heart failure medicines  Here are the most common heart failure medicines:  · ACE inhibitors lower blood pressure and decrease strain on the heart. This makes it easier for the heart to pump. Angiotensin receptor blockers have similar effects. These are prescribed for some patients instead of ACE inhibitors.  · Beta-blockers relieve stress on the heart. They also improve symptoms. They may also improve the heart's pumping action over time.  · Diuretics (also called water pills) help rid your body of excess water. This can help rid your body of swelling (edema). Having less fluid to pump means your heart doesnt have to work as hard. Some diuretics make your body lose a mineral called potassium. Your doctor will tell you if you need to take supplements or eat more foods high in potassium.  · Digoxin helps your heart pump with more strength. This helps your heart pump more blood with each beat. So, more oxygen-rich blood travels to the rest of the body.  · Aldosterone antagonists help alter hormones and decrease strain on the heart.  · Hydralazine and nitrates are two separate medicines used together to treat heart failure. They may come in one combination pill. They lower blood pressure and decrease how hard the heart has to pump.  Medicines for related conditions  Controlling other heart problems helps keep heart failure under control, too. Depending on other heart  problems you have, medicines may be prescribed to:  · Lower blood pressure (antihypertensives).  · Lower cholesterol levels (statins).  · Prevent blood clots (anticoagulants or aspirin).  · Keep the heartbeat steady (antiarrhythmics).  Date Last Reviewed: 3/5/2016  © 1629-0494 [employer]. [address] [place] [address], [place] [address]. All rights reserved. This information is not intended as a substitute for professional medical care. Always follow your healthcare professional's instructions.              Heart Failure: Procedures That May Help    The heart is a muscle that pumps oxygen-rich blood to all parts of the body. When you have heart failure, the heart is not able to pump as well as it should. Blood and fluid may back up into the lungs (congestive heart failure), and some parts of the body dont get enough oxygen-rich blood to work normally. These problems lead to the symptoms of heart failure.     Certain procedures may help the heart pump better in some cases of heart failure. Some procedures are done to treat health problems that may have caused the heart failure such as coronary artery disease or heart rhythm problems. For more serious heart failure, other options are available.  Treating artery and valve problems  If you have coronary artery disease or valve disease, procedures may be done to improve blood flow. This helps the heart pump better, which can improve heart failure symptoms. First, your doctor may do a cardiac catheterization to help detect clogged blood vessels or valve damage. During this procedure, a  thin tube (catheter) in inserted into a blood vessel and guided to the heart. There a dye is injected and a special type of X-ray (angiogram) is taken of the blood vessels. Procedures to open a blocked artery or fix damaged valves can also be done using catheterization.  · Angioplasty uses a balloon-tipped instrument at the end of the catheter. The balloon is inflated to widen the  narrowed artery. In many cases, a stent is expanded to further support the narrowed artery. A stent is a metal mesh tube.  · Valve surgery repairs or replacement of faulty valves can also be done during catheterization so blood can flow properly through the chambers of the heart.  Bypass surgery is another option to help treat blocked arteries. It uses a healthy blood vessel from elsewhere in the body. The healthy blood vessel is attached above and below the blocked area so that blood can flow around the blocked artery.  Treating heart rhythm problems  A device may be placed in the chest to help a weak heart maintain a healthy, heartbeat so the heart can pump more effectively:  · Pacemaker. A pacemaker is an implanted device that regulates your heartbeat electronically. It monitors your heart's rhythm and generates a painless electric impulse that helps the heart beat in a regular rhythm. A pacemaker is programmed to meet your specific heart rhythm needs.  · Biventricular pacing/cardiac resynchronization therapy. A type of pacemaker that paces both pumping chambers of the heart at the same time to coordinate contractions and to improve the heart's function. Some people with heart failure are candidates for this therapy.  · Implantable cardioverter defibrillator. A device similar to a pacemaker that senses when the heart is beating too fast and delivers an electrical shock to convert the fast rhythm to a normal rhythm. This can be a life saving device.  In severe cases  In more serious cases of heart failure when other treatments no longer work, other options may include:  · Ventricular assist devices (VADs). These are mechanical devices used to take over the pumping function for one or both of the heart's ventricles, or pumping chambers. A VAD may be necessary when heart failure progresses to the point that medicines and other treatments no longer help. In some cases, a VAD may be used as a bridge to  transplant.  · Heart transplant. This is replacing the diseased heart with a healthy one from a donor. This is an option for a few people who are very sick. A heart transplant is very serious and not an option for all patients. Your doctor can tell you more.  Date Last Reviewed: 3/20/2016  © 5139-4139 [employer]. [address] [place] [address], [place] [address]. All rights reserved. This information is not intended as a substitute for professional medical care. Always follow your healthcare professional's instructions.              Heart Failure: Tracking Your Weight  You have a condition called heart failure. When you have heart failure, a sudden weight gain or a steady rise in weight is a warning sign that your body is retaining too much water and salt. This could mean your heart failure is getting worse. If left untreated, it can cause problems for your lungs and result in shortness of breath. Weighing yourself each day is the best way to know if youre retaining water. If your weight goes up quickly, call your doctor. You will be given instructions on how to get rid of the excess water. You will likely need medicines and to avoid salt. This will help your heart work better.  Call your doctor if you gain more than 2 pounds in 1 day, more than 5 pounds in 1 week, or whatever weight gain you were told to report by your doctor. This is often a sign of worsening heart failure and needs to be evaluated and treated. Your doctor will tell you what to do next.   Tips for weighing yourself    · Weigh yourself at the same time each morning, wearing the same clothes. Weigh yourself after urinating and before eating.  · Use the same scale each day. Make sure the numbers are easy to read. Put the scale on a flat, hard surface -- not on a rug or carpet.  · Do not stop weighing yourself. If you forget one day, weigh again the next morning.  How to use your weight chart  · Keep your weight chart near the scale. Write  your weight on the chart as soon as you get off the scale.  · Fill in the month and the start date on the chart. Then write down your weight each day. Your chart will look like this:    · If you miss a day, leave the space blank. Weigh yourself the next day and write your weight in the next space.  · Take your weight chart with you when you go to see your doctor.  Date Last Reviewed: 3/20/2016  © 8368-5763 [employer]. [address] [place] [address]. All rights reserved. This information is not intended as a substitute for professional medical care. Always follow your healthcare professional's instructions.              Heart Failure: Warning Signs of a Flare-Up  You have a condition called heart failure. Once you have heart failure, flare-ups can happen. Below are signs that can mean your heart failure is getting worse. If you notice any of these warning signs, call your healthcare provider.  Swelling    · Your feet, ankles, or lower legs get puffier.  · You notice skin changes on your lower legs.  · Your shoes feel too tight.  · Your clothes are tighter in the waist.  · You have trouble getting rings on or off your fingers.  Shortness of breath  · You have to breathe harder even when youre doing your normal activities or when youre resting.  · You are short of breath walking up stairs or even short distances.  · You wake up at night short of breath or coughing.  · You need to use more pillows or sit up to sleep.  · You wake up tired or restless.  Other warning signs  · You feel weaker, dizzy, or more tired.  · You have chest pain or changes in your heartbeat.  · You have a cough that wont go away.  · You cant remember things or dont feel like eating.  Tracking your weight  Gaining weight is often the first warning sign that heart failure is getting worse. Gaining even a few pounds can be a sign that your body is retaining excess water and salt. Weighing yourself each day in the  morning after you urinate and before you eat, is the best way to know if you're retaining water. Get a scale that is easy to read and make sure you wear the same clothes and use the same scale every time you weigh. Your healthcare provider will show you how to track your weight. Call your doctor if you gain more than 2 pounds in 1 day, 5 pounds in 1 week, or whatever weight gain you were told to report by your doctor. This is often a sign of worsening heart failure and needs to be evaluated and treated before it compromises your breathing. Your doctor will tell you what to do next.    Date Last Reviewed: 3/15/2016  © 0899-4077 [employer]. [address] [place] [address], [place] [address]. All rights reserved. This information is not intended as a substitute for professional medical care. Always follow your healthcare professional's instructions.              Chronic Kindey Disease Education              [facility] complies with applicable Federal civil rights laws and does not discriminate on the basis of race, color, national origin, age, disability, or sex.

## 2017-02-03 NOTE — H&P (VIEW-ONLY)
Subjective:    Patient ID:  Adama Alvarez is a 56 y.o. male who presents for evaluation of Angina Equivalent and abnormal findings on his Coronary Angiogram.    HPI  55 y/o M with history of ischemic CMP (EF 40-45%), previous LAD and LCx stents (CIS -  in 2006 followed by PTA only in June 2014 by ), PAD, HTN and dyslipidemia who was referred by Dr.Lee Busby following his last coronary angiogram on Nov 2016.    The patient was evaluated by  for worsening DE LEON, had an abnormal stress testing (moderate reversibel defect in apical wall) and received coronary angiogram at [facility] showing severe ISR of previously placed LAD stent and moderate disease of prox LCx.     Patient also has a long standing history of claudication. He experience lower ext (calf mm and feet, L>R) pain when walking 100-150 ft. Denied any orthopnea, PND, LE swelling, palpitations, headaches or nausea. No changes in bilateral sciatica pain. Patient was taking methocarbamol for neurogenic claudication in past but stopped it because it was causing him severe itching. Patient compliant with all other medications. Former smoker 1.5 PPD x 40 yrs (Quit - 4/2016) but retstarted again about 1-2 cigarette.     Review of Systems   Constitution: Positive for weight gain (12 lb for the last 3 mo).   HENT: Negative.    Eyes: Negative.    Cardiovascular: Positive for chest pain, claudication, dyspnea on exertion and orthopnea. Negative for leg swelling, near-syncope, palpitations, paroxysmal nocturnal dyspnea and syncope.   Respiratory: Positive for cough and shortness of breath.    Hematologic/Lymphatic: Bruises/bleeds easily.   Skin: Negative.    Musculoskeletal: Positive for joint pain (bilateral hips) and muscle cramps (calf mm (R>L)).   Gastrointestinal: Negative.    Genitourinary: Negative.    Neurological: Positive for loss of balance (not using any cane ).   Psychiatric/Behavioral: Negative.         Objective:     Physical Exam   Constitutional: He is oriented to person, place, and time. He appears well-developed and well-nourished. No distress.   HENT:   Head: Normocephalic and atraumatic.   Neck: Neck supple. JVD present.   Cardiovascular: Regular rhythm.    Murmur heard.   Harsh midsystolic murmur is present with a grade of 2/6  at the upper right sternal border radiating to the neck  Pulses:       Carotid pulses are 2+ on the right side, and 2+ on the left side.       Radial pulses are 2+ on the right side, and 2+ on the left side.        Femoral pulses are 2+ on the right side, and 1+ on the left side.       Dorsalis pedis pulses are 1+ on the right side        Posterior tibial pulses are 2+ on the right side, and 1+ on the left side.   Normal Chapin's test - R-hand   Pulmonary/Chest: Effort normal and breath sounds normal. No respiratory distress. He has no wheezes.   Abdominal: Soft. He exhibits no distension. There is no tenderness.   Musculoskeletal: He exhibits no edema.   Neurological: He is alert and oriented to person, place, and time.   Skin: Skin is warm and dry.   Psychiatric: He has a normal mood and affect. His behavior is normal.         Assessment:       1. CAD   Cor Angio (Nov 2016) ISR of LAD stent and Prox LCx dis   Abnormal Stress test in apical wall   LVEF (45%) per last echo - Oct 2015    2. Claudication (sunny IIa) - L>R   Abnormal OLIVIA (left 0.65) - July 2016   CTA aort0-iliac w RO (Sept 2015): Bilateral lower extremity outflow stenosis, with multiple tandem 50% stenotic lesions in distal right SFA, long segment of complete occlusion of mid left SFA; both popliteal arteries normal in caliber with 3-vessel runoff to ankle.    3. HTN   Not controlled.   Quit Imdur because of concerns about sleep disturbances.  4. HLP   On statin    5. CKD- stage III   Cr baseline - 1.4-1.5    6.Current smoking   Counseled about smoking cessation.  7. PETRONA       Plan:           Consented for [facility] w possible  PCI  Radiation oncology for Brachytherapy treatment.    The risks (including death, heart attack, limb loss, paralysis, emergency surgery, bleeding, renal failure, and stroke), the potential benefits of the procedure, and the alternatives to the procedure, were discussed in detail and accepted by the patient. All questions were answered. Patient agrees to proceed.    Maryanne Zapien MD  Interventional Cardiology Fellow, PGY-9  1/24/2017 12:11 PM         I have personally taken the history and examined this patient. I have discussed and agree with the resident's findings and plan as documented in the resident's note.  Yusef Arizmendi

## 2017-02-03 NOTE — CONSULTS
"Cardiac Rehab     Adama Alvarez   [id number]   2/3/2017       Activity taught: Yes    Cardiac Rehab Phase Taught: Phase 1    Risk Factors-Modifiable: nutrition, hyperlipidemia, hypertension, sedentary lifestyle, stress, exercise    Risk Factors-Non modifiable: age, gender    Teaching Method: Verbal, Written, Living with Heart Disease    Understanding: Yes    Response: Verbalized understanding and questions answered. Due to his insurance coverage location, he will not go to cardiac rehab. Provided him with strength training exercises and a walking program he can do at home. Smoking cessation counseling <10 minutes. He is down from 3 packs a day to 2-3 cigarettes per day. He wants to quit smoking completely, but feels it is extremely difficult. Provided him with 1-800-Quit-Now resources. States there are local smoking cessation resources for him to participate.     Comments: S/P PTCA. Discussed cardiac rehab and risk factor modification. Patient declined Cardiac Rehab Phase II due insurance and location. Educational materials were used in the process and given to the patient. They included "Your Guide to Living with Heart Disease", Phase Two Cardiac Rehabilitation information along with a sample Mediterranean diet.The patient expressed understanding of the teaching and expressed desire to take a role in modifying the risk factors when they return home.        "

## 2017-02-03 NOTE — PROGRESS NOTES
Pt returned to room AAOx4 and denies pain.  Groin site c/d/i.  VSS.  Family called to bedside.  Post procedure protocol reviewed.  Will continue to monitor.

## 2017-02-03 NOTE — PROGRESS NOTES
"Post Cath Note  Referring Physician: Dr Busby  Procedure: POBA, brachytherapy  Indication: CCS III angina    HPI:   55yo with CAD with ISR of LAD stent with CCS III angina    Cath Results:   Access:  R CFA    LAD with 70% ISR with FFR of 0.78 with iv adenosine    See full cath report for further details    Intervention:   Orbital atherectomy of LAD ISR followed by POBA with a 3.0 x 20 mm balloon  Brachytherapy by Rad-onc    Closure device: Mynx  Patient tolerated the procedure well, no complications    Post Cath Exam:     Visit Vitals    /62 (BP Location: Right arm, Patient Position: Lying, BP Method: Automatic)    Pulse 78    Temp 98.2 °F (36.8 °C) (Oral)    Resp 16    Ht 5' 6" (1.676 m)    Wt 92.5 kg (204 lb)    SpO2 96%    BMI 32.93 kg/m2     No unusual pain, hematoma, thrill or bruit at vascular access site.  Distal pulse present without signs of ischemia.    Recommendations:   DAPT for atleast 1 year  High intensity statin    Signed:  Gennaro ma MD  Interventional Cardiology Fellow  Pager: [phone]  2/3/2017 12:21 PM  "

## 2017-02-03 NOTE — PLAN OF CARE
Problem: Fall Risk (Adult)  Goal: Absence of Falls  Patient will demonstrate the desired outcomes by discharge/transition of care.   Outcome: Ongoing (interventions implemented as appropriate)  Post procedure protocol and safety issues reviewed    Problem: Cardiac Catheterization (Diagnostic/Interventional) (Adult)  Goal: Signs and Symptoms of Listed Potential Problems Will be Absent, Minimized or Managed (Cardiac Catheterization)  Signs and symptoms of listed potential problems will be absent, minimized or managed by discharge/transition of care (reference Cardiac Catheterization (Diagnostic/Interventional) (Adult) CPG).   Outcome: Ongoing (interventions implemented as appropriate)  R groin site with Mynx CDI. Palpable pulses to BLE.

## 2017-02-04 VITALS
OXYGEN SATURATION: 99 % | HEART RATE: 62 BPM | BODY MASS INDEX: 32.78 KG/M2 | WEIGHT: 204 LBS | HEIGHT: 66 IN | DIASTOLIC BLOOD PRESSURE: 55 MMHG | TEMPERATURE: 98 F | SYSTOLIC BLOOD PRESSURE: 100 MMHG | RESPIRATION RATE: 18 BRPM

## 2017-02-04 NOTE — DISCHARGE INSTRUCTIONS
1. Do not strain or lift anything greater than 5 lb for 3 days.   2. Do not drive or operate any dangerous machinery for 24 hours.   3. Keep the dressing on, clean, and dry for 24 hours.   4. After 24 hours, the dressing may be removed and a shower is allowed.   5. Clean the area with mild soap and water.   6. Once the skin has healed (3 days), bathing in a tub or swimming is allowed.   7. Inspect the groin site daily and report to the physician any signs of infection at the site: redness, pain, fever >100.4, unusual pain at the   access site or affected extremity, unusual swelling at the access site, or any yellow, white or green drainage.  Call 911 if you have:   Bleeding from the puncture site that you cannot stop by doing the following:   Relax and lie down right away. Keep your leg flat and apply firm pressure to the site using your fingers and a gauze pad. Keep the pressure on for 10 minutes. Continue this until the bleeding stops. This may take awhile. When bleeding stops, cover the site with a sterile bandage and keep your leg still as much as possible.

## 2017-02-04 NOTE — PLAN OF CARE
Problem: Patient Care Overview  Goal: Plan of Care Review  Outcome: Ongoing (interventions implemented as appropriate)  Patient progressing toward all goals. Plan of care discussed with patient, no questions at this time. Patient ambulating independently, fall precautions in place. No c/o of pain or discomfort. Pulses 2+, dressing CDI.  Patient to be discharged home today.Will monitor.

## 2017-02-06 LAB
POC ACTIVATED CLOTTING TIME K: 276 SEC (ref 74–137)
SAMPLE: ABNORMAL

## 2017-02-11 ENCOUNTER — HOSPITAL ENCOUNTER (OUTPATIENT)
Dept: SLEEP MEDICINE | Facility: HOSPITAL | Age: 57
Discharge: HOME | End: 2017-02-11
Attending: NURSE PRACTITIONER
Payer: MEDICAID

## 2017-02-11 PROCEDURE — 95811 POLYSOM 6/>YRS CPAP 4/> PARM: CPT

## 2017-03-29 PROBLEM — R06.09 DYSPNEA ON EXERTION: Status: ACTIVE | Noted: 2017-03-29

## 2017-03-29 PROBLEM — I20.89 ANGINAL EQUIVALENT: Status: RESOLVED | Noted: 2017-01-24 | Resolved: 2017-03-29

## 2017-06-05 PROBLEM — G47.33 OSA ON CPAP: Status: ACTIVE | Noted: 2017-06-05

## 2017-08-01 ENCOUNTER — PATIENT MESSAGE (OUTPATIENT)
Dept: RESEARCH | Facility: HOSPITAL | Age: 57
End: 2017-08-01

## 2018-03-29 PROBLEM — R06.09 DYSPNEA ON EXERTION: Status: RESOLVED | Noted: 2017-03-29 | Resolved: 2018-03-29

## 2018-03-29 PROBLEM — I25.2 MYOCARDIAL INFARCT, OLD: Status: ACTIVE | Noted: 2018-03-29

## 2018-09-27 PROBLEM — R06.2 WHEEZING: Status: ACTIVE | Noted: 2018-09-27

## 2018-09-27 PROBLEM — M54.16 LEFT LUMBAR RADICULOPATHY: Status: ACTIVE | Noted: 2018-09-27

## 2019-01-21 DIAGNOSIS — I73.9 PAD (PERIPHERAL ARTERY DISEASE): Primary | ICD-10-CM

## 2019-01-21 RX ORDER — SODIUM CHLORIDE 0.9 % (FLUSH) 0.9 %
5 SYRINGE (ML) INJECTION
Status: CANCELLED | OUTPATIENT
Start: 2019-01-21

## 2019-01-21 RX ORDER — LIDOCAINE HYDROCHLORIDE 10 MG/ML
1 INJECTION, SOLUTION EPIDURAL; INFILTRATION; INTRACAUDAL; PERINEURAL ONCE
Status: CANCELLED | OUTPATIENT
Start: 2019-01-21 | End: 2019-01-21

## 2019-02-21 DIAGNOSIS — I73.9 PAD (PERIPHERAL ARTERY DISEASE): Primary | ICD-10-CM

## 2019-02-21 RX ORDER — LIDOCAINE HYDROCHLORIDE 10 MG/ML
1 INJECTION, SOLUTION EPIDURAL; INFILTRATION; INTRACAUDAL; PERINEURAL ONCE
Status: CANCELLED | OUTPATIENT
Start: 2019-02-21 | End: 2019-02-21

## 2019-02-21 RX ORDER — SODIUM CHLORIDE 0.9 % (FLUSH) 0.9 %
5 SYRINGE (ML) INJECTION
Status: CANCELLED | OUTPATIENT
Start: 2019-02-21

## 2019-09-04 PROBLEM — I65.21 STENOSIS OF RIGHT CAROTID ARTERY: Status: ACTIVE | Noted: 2019-09-04

## 2019-09-04 PROBLEM — E78.2 MIXED HYPERLIPIDEMIA: Status: ACTIVE | Noted: 2019-09-04

## 2020-03-04 PROBLEM — I36.1 NONRHEUMATIC TRICUSPID VALVE REGURGITATION: Status: ACTIVE | Noted: 2020-03-04

## 2021-05-06 ENCOUNTER — PATIENT MESSAGE (OUTPATIENT)
Dept: RESEARCH | Facility: HOSPITAL | Age: 61
End: 2021-05-06

## 2021-07-01 ENCOUNTER — PATIENT MESSAGE (OUTPATIENT)
Dept: ADMINISTRATIVE | Facility: OTHER | Age: 61
End: 2021-07-01

## 2022-05-02 ENCOUNTER — PATIENT MESSAGE (OUTPATIENT)
Dept: SMOKING CESSATION | Facility: CLINIC | Age: 62
End: 2022-05-02
Payer: MEDICARE

## 2022-07-25 PROBLEM — I20.9 ANGINA, CLASS II: Status: ACTIVE | Noted: 2022-07-25

## 2022-07-25 PROBLEM — R94.39 ABNORMAL MYOCARDIAL PERFUSION STUDY: Status: ACTIVE | Noted: 2022-07-25

## 2022-07-25 PROBLEM — I25.5 ISCHEMIC CARDIOMYOPATHY: Status: ACTIVE | Noted: 2022-07-25

## 2022-07-26 ENCOUNTER — HOSPITAL ENCOUNTER (OUTPATIENT)
Dept: SLEEP MEDICINE | Facility: HOSPITAL | Age: 62
Discharge: HOME | End: 2022-07-26
Attending: NURSE PRACTITIONER
Payer: MEDICARE

## 2022-07-26 DIAGNOSIS — G47.33 OBSTRUCTIVE SLEEP APNEA (ADULT) (PEDIATRIC): ICD-10-CM

## 2022-07-26 PROCEDURE — 95810 POLYSOM 6/> YRS 4/> PARAM: CPT

## 2023-09-29 NOTE — PROGRESS NOTES
Patient discharged per MD orders. Instructions given on medications, wound care, activity, signs of infection, when to call MD, and follow up appointments. Pt verbalized understanding.  Patient AAOx3, VSS, no c/o pain or discomfort at this time. Telemetry and PIV removed. Patient and family refused transport, ambulated off unit.    Propranolol Pregnancy And Lactation Text: This medication is Pregnancy Category C and it isn't known if it is safe during pregnancy. It is excreted in breast milk.

## 2024-10-05 PROBLEM — A41.9 SEVERE SEPSIS: Status: ACTIVE | Noted: 2024-10-05

## 2024-10-05 PROBLEM — R06.02 SOB (SHORTNESS OF BREATH): Status: ACTIVE | Noted: 2024-10-05

## 2024-10-05 PROBLEM — A41.9 SEPSIS: Status: ACTIVE | Noted: 2024-10-05

## 2024-10-05 PROBLEM — F25.0 SCHIZOAFFECTIVE DISORDER, BIPOLAR TYPE: Status: ACTIVE | Noted: 2024-10-05

## 2024-10-05 PROBLEM — R65.20 SEVERE SEPSIS: Status: ACTIVE | Noted: 2024-10-05

## 2024-10-05 PROBLEM — F19.10 POLYSUBSTANCE ABUSE: Status: ACTIVE | Noted: 2024-10-05

## 2024-10-05 PROBLEM — N17.9 AKI (ACUTE KIDNEY INJURY): Status: ACTIVE | Noted: 2024-10-05

## 2024-10-05 PROBLEM — F17.200 TOBACCO DEPENDENCE: Status: ACTIVE | Noted: 2024-10-05

## 2024-10-05 PROBLEM — F15.929 METHAMPHETAMINE INTOXICATION: Status: ACTIVE | Noted: 2024-10-05

## 2024-10-06 PROBLEM — R78.81 MSSA BACTEREMIA: Status: ACTIVE | Noted: 2024-10-06

## 2024-10-06 PROBLEM — I48.91 A-FIB: Status: ACTIVE | Noted: 2024-10-06

## 2024-10-06 PROBLEM — B95.61 MSSA BACTEREMIA: Status: ACTIVE | Noted: 2024-10-06

## 2024-10-06 PROBLEM — A41.9 SEPSIS: Status: RESOLVED | Noted: 2024-10-05 | Resolved: 2024-10-06

## 2024-10-11 ENCOUNTER — NURSE TRIAGE (OUTPATIENT)
Dept: ADMINISTRATIVE | Facility: CLINIC | Age: 64
End: 2024-10-11

## 2024-10-11 PROBLEM — I72.9 MYCOTIC ANEURYSM: Status: ACTIVE | Noted: 2024-10-11

## 2024-10-11 PROBLEM — E63.9 INADEQUATE DIETARY ENERGY INTAKE: Status: ACTIVE | Noted: 2024-10-11

## 2024-10-11 PROBLEM — I71.8: Status: ACTIVE | Noted: 2024-10-11

## 2024-10-11 NOTE — TELEPHONE ENCOUNTER
Daughter Ju calling on line with pt's friend. Calling to notify Dr. Alhaji Garcia that pt is [facility] ICU with a blood infection and Aortic aneurysm. Chart viewed; Dr. Garcia is a non [facility] provider-unable to notify. Daughter made aware and advised to contact Dr. Garcia's office. VU. Encounter routed to other [facility] providers.   Reason for Disposition   Health information question, no triage required and triager able to answer question    Protocols used: Information Only Call - No Triage-A-